# Patient Record
Sex: FEMALE | URBAN - METROPOLITAN AREA
[De-identification: names, ages, dates, MRNs, and addresses within clinical notes are randomized per-mention and may not be internally consistent; named-entity substitution may affect disease eponyms.]

---

## 2024-01-03 ENCOUNTER — APPOINTMENT (RX ONLY)
Dept: URBAN - METROPOLITAN AREA CLINIC 89 | Facility: CLINIC | Age: 44
Setting detail: DERMATOLOGY
End: 2024-01-03

## 2024-01-03 DIAGNOSIS — L63.0 ALOPECIA (CAPITIS) TOTALIS: ICD-10-CM

## 2024-01-03 PROCEDURE — ? COUNSELING

## 2024-01-03 PROCEDURE — ? OLUMIANT INITIATION

## 2024-01-03 PROCEDURE — 99204 OFFICE O/P NEW MOD 45 MIN: CPT

## 2024-01-03 PROCEDURE — ? ORDER TESTS

## 2024-01-03 ASSESSMENT — LOCATION SIMPLE DESCRIPTION DERM: LOCATION SIMPLE: POSTERIOR SCALP

## 2024-01-03 ASSESSMENT — LOCATION DETAILED DESCRIPTION DERM: LOCATION DETAILED: POSTERIOR MID-PARIETAL SCALP

## 2024-01-03 ASSESSMENT — LOCATION ZONE DERM: LOCATION ZONE: SCALP

## 2024-01-03 NOTE — HPI: HAIR LOSS
Additional History: Unresponsive to ILK snd systemic steroids.\\nNo hx CA, TB, thrombotic event including  CVA, MI, and DVT.\\nNo po meds.\\nNo smoking hx.

## 2024-01-03 NOTE — PROCEDURE: ORDER TESTS
Billing Type: Third-Party Bill
Performing Laboratory: [id number]
Bill For Surgical Tray: no
Expected Date Of Service: 01/03/2024

## 2024-01-03 NOTE — PROCEDURE: OLUMIANT INITIATION
Is Cyclosporine Contraindicated?: No
Detail Level: Zone
Olumiant Monitoring Guidelines: CBC, lipids, hepatitis screening, and TB screening should be checked prior to initiating Olumiant therapy.  Labs may also be checked periodically after the initiation period.
Diagnosis (Required): Alopecia Areata
Pregnancy And Lactation Warning Text: Based on animal studies, Olumiant may cause embryo-fetal harm when administered to pregnant women.  The medication should not be used in pregnancy.  Breastfeeding is not recommended during treatment.
Olumiant Dosing: 2mg Daily

## 2024-01-24 ENCOUNTER — APPOINTMENT (RX ONLY)
Dept: URBAN - METROPOLITAN AREA CLINIC 89 | Facility: CLINIC | Age: 44
Setting detail: DERMATOLOGY
End: 2024-01-24

## 2024-01-24 DIAGNOSIS — L81.0 POSTINFLAMMATORY HYPERPIGMENTATION: ICD-10-CM

## 2024-01-24 DIAGNOSIS — L63.0 ALOPECIA (CAPITIS) TOTALIS: ICD-10-CM

## 2024-01-24 PROCEDURE — ? LAB REPORTS REVIEWED

## 2024-01-24 PROCEDURE — ? COUNSELING

## 2024-01-24 PROCEDURE — 99214 OFFICE O/P EST MOD 30 MIN: CPT

## 2024-01-24 PROCEDURE — ? PRESCRIPTION MEDICATION MANAGEMENT

## 2024-01-24 PROCEDURE — ? OLUMIANT INITIATION

## 2024-01-24 PROCEDURE — ? ORDER TESTS

## 2024-01-24 ASSESSMENT — LOCATION SIMPLE DESCRIPTION DERM
LOCATION SIMPLE: CHIN
LOCATION SIMPLE: LEFT CHEEK
LOCATION SIMPLE: POSTERIOR SCALP

## 2024-01-24 ASSESSMENT — LOCATION DETAILED DESCRIPTION DERM
LOCATION DETAILED: LEFT CHIN
LOCATION DETAILED: POSTERIOR MID-PARIETAL SCALP
LOCATION DETAILED: LEFT INFERIOR CENTRAL MALAR CHEEK

## 2024-01-24 ASSESSMENT — LOCATION ZONE DERM
LOCATION ZONE: SCALP
LOCATION ZONE: FACE
LOCATION ZONE: FACE

## 2024-01-24 NOTE — PROCEDURE: OLUMIANT INITIATION
Add Pregnancy And Lactation Warning To Medication Counseling?: No
Detail Level: Zone
Diagnosis (Required): Alopecia Areata
Pregnancy And Lactation Warning Text: Based on animal studies, Olumiant may cause embryo-fetal harm when administered to pregnant women.  The medication should not be used in pregnancy.  Breastfeeding is not recommended during treatment.
Olumiant Monitoring Guidelines: CBC, lipids, hepatitis screening, and TB screening should be checked prior to initiating Olumiant therapy.  Labs may also be checked periodically after the initiation period.
Olumiant Dosing: 4mg Daily

## 2024-01-24 NOTE — PROCEDURE: PRESCRIPTION MEDICATION MANAGEMENT
Plan: HQ intolerance \\nAzelaic acid 25% and niacinamide 4% cream sent to [facility] (Darline takes the prescription)
Detail Level: Zone
Render In Strict Bullet Format?: No

## 2024-01-24 NOTE — PROCEDURE: LAB REPORTS REVIEWED
Summarized Lab Results: , HEMOGLOBIN 10.3 - 01/16/2024.  Copy given to pt- will see PCP for anemia evaluation
Detail Level: Zone

## 2024-01-24 NOTE — PROCEDURE: ORDER TESTS
Expected Date Of Service: 01/24/2024
Billing Type: Third-Party Bill
Lab Facility: [id number]
Bill For Surgical Tray: no
Performing Laboratory: [id number]